# Patient Record
Sex: FEMALE | Race: BLACK OR AFRICAN AMERICAN | URBAN - METROPOLITAN AREA
[De-identification: names, ages, dates, MRNs, and addresses within clinical notes are randomized per-mention and may not be internally consistent; named-entity substitution may affect disease eponyms.]

---

## 2021-09-15 ENCOUNTER — WEB ENCOUNTER (OUTPATIENT)
Dept: URBAN - METROPOLITAN AREA CLINIC 126 | Facility: CLINIC | Age: 31
End: 2021-09-15

## 2021-09-15 ENCOUNTER — OFFICE VISIT (OUTPATIENT)
Dept: URBAN - METROPOLITAN AREA CLINIC 126 | Facility: CLINIC | Age: 31
End: 2021-09-15
Payer: COMMERCIAL

## 2021-09-15 DIAGNOSIS — K59.00 CONSTIPATION, UNSPECIFIED CONSTIPATION TYPE: ICD-10-CM

## 2021-09-15 DIAGNOSIS — R10.84 GENERALIZED ABDOMINAL PAIN: ICD-10-CM

## 2021-09-15 PROCEDURE — 99204 OFFICE O/P NEW MOD 45 MIN: CPT | Performed by: INTERNAL MEDICINE

## 2021-09-15 NOTE — HPI-TODAY'S VISIT:
The patient was referred by Dr. Rachel Nielsen for abdominal pain .   A copy of this document is being forwarded to the referring provider.31-year-old female with long history of constipation seen today for complaints of abdominal pain.  Constipation has been ongoing since she was a child.  June of 2020 she began having abdominal pain and bloating associated with constipation.  She uses various over-the-counter medications stool softeners and miralax with poor results.  She can go 2 weeks with no bowel movement and complains of severe gas.   Dicyclomine did not help with abdominal pain.  She was recently started on hyoscyamine. She will eventually eat a greasy fatty meal and drink a red bull and will have a bowel movement.  Her weight is stable.  Her appetite is good.  She denies anemia or overt GI bleeding.  There is no known family history of colon polyps, colon cancer, or IDB.

## 2021-09-21 ENCOUNTER — WEB ENCOUNTER (OUTPATIENT)
Dept: URBAN - METROPOLITAN AREA CLINIC 126 | Facility: CLINIC | Age: 31
End: 2021-09-21

## 2021-09-21 RX ORDER — LINACLOTIDE 290 UG/1
1 CAPSULE AT LEAST 30 MINUTES BEFORE THE FIRST MEAL OF THE DAY ON AN EMPTY STOMACH CAPSULE, GELATIN COATED ORAL ONCE A DAY
Qty: 30 CAPSULE | Refills: 5 | OUTPATIENT

## 2021-10-20 ENCOUNTER — OFFICE VISIT (OUTPATIENT)
Dept: URBAN - METROPOLITAN AREA CLINIC 126 | Facility: CLINIC | Age: 31
End: 2021-10-20
Payer: COMMERCIAL

## 2021-10-20 ENCOUNTER — LAB OUTSIDE AN ENCOUNTER (OUTPATIENT)
Dept: URBAN - METROPOLITAN AREA CLINIC 126 | Facility: CLINIC | Age: 31
End: 2021-10-20

## 2021-10-20 DIAGNOSIS — K59.00 CONSTIPATION, UNSPECIFIED CONSTIPATION TYPE: ICD-10-CM

## 2021-10-20 DIAGNOSIS — R10.84 GENERALIZED ABDOMINAL PAIN: ICD-10-CM

## 2021-10-20 PROCEDURE — 99214 OFFICE O/P EST MOD 30 MIN: CPT | Performed by: NURSE PRACTITIONER

## 2021-10-20 RX ORDER — LINACLOTIDE 290 UG/1
1 CAPSULE AT LEAST 30 MINUTES BEFORE THE FIRST MEAL OF THE DAY ON AN EMPTY STOMACH CAPSULE, GELATIN COATED ORAL ONCE A DAY
Qty: 30 CAPSULE | Refills: 5 | Status: ACTIVE | COMMUNITY

## 2021-10-20 NOTE — HPI-TODAY'S VISIT:
Very pleasant 31-year-old female seen today in follow-up for abdominal pain and constipation. Her constipation has been ongoing since childhood and she has had poor response to over-the-counter medications. At her initial visit we gave patient samples of Linzess 290 mcg. She had good results and requested a prescription which we sent. She has been taking hyoscyamine for abdominal pain. She is seen today in follow-up. The Linzess daily was too effective.  She decreased frequency to every 2 to 3 days.  She had continues to still have diarrhea.  The abdominal pain did not improve with hyoscyamine or with bowel movements.  She has poor appetite due to abdominal pain.  She does have a history of migraines which have been worse lately.

## 2021-10-22 PROBLEM — 197119006 ACUTE CONSTIPATION: Status: ACTIVE | Noted: 2021-10-22

## 2021-10-25 ENCOUNTER — OFFICE VISIT (OUTPATIENT)
Dept: URBAN - METROPOLITAN AREA SURGERY CENTER 19 | Facility: SURGERY CENTER | Age: 31
End: 2021-10-25

## 2021-11-22 ENCOUNTER — OFFICE VISIT (OUTPATIENT)
Dept: URBAN - METROPOLITAN AREA SURGERY CENTER 19 | Facility: SURGERY CENTER | Age: 31
End: 2021-11-22

## 2022-09-23 ENCOUNTER — OFFICE VISIT (OUTPATIENT)
Dept: URBAN - METROPOLITAN AREA CLINIC 92 | Facility: CLINIC | Age: 32
End: 2022-09-23
Payer: COMMERCIAL

## 2022-09-23 ENCOUNTER — LAB OUTSIDE AN ENCOUNTER (OUTPATIENT)
Dept: URBAN - METROPOLITAN AREA CLINIC 92 | Facility: CLINIC | Age: 32
End: 2022-09-23

## 2022-09-23 VITALS
HEART RATE: 76 BPM | SYSTOLIC BLOOD PRESSURE: 117 MMHG | BODY MASS INDEX: 26.74 KG/M2 | TEMPERATURE: 96.8 F | HEIGHT: 67 IN | WEIGHT: 170.4 LBS | DIASTOLIC BLOOD PRESSURE: 80 MMHG

## 2022-09-23 DIAGNOSIS — K21.9 GASTROESOPHAGEAL REFLUX DISEASE WITHOUT ESOPHAGITIS: ICD-10-CM

## 2022-09-23 DIAGNOSIS — K59.00 CONSTIPATION, UNSPECIFIED CONSTIPATION TYPE: ICD-10-CM

## 2022-09-23 DIAGNOSIS — R10.84 GENERALIZED ABDOMINAL PAIN: ICD-10-CM

## 2022-09-23 DIAGNOSIS — R11.0 NAUSEA: ICD-10-CM

## 2022-09-23 DIAGNOSIS — K58.1 IRRITABLE BOWEL SYNDROME WITH CONSTIPATION: ICD-10-CM

## 2022-09-23 PROCEDURE — 99214 OFFICE O/P EST MOD 30 MIN: CPT

## 2022-09-23 RX ORDER — FAMOTIDINE 40 MG/1
1 TABLET AT BEDTIME TABLET, FILM COATED ORAL ONCE A DAY
Qty: 90 TABLET | Refills: 1 | OUTPATIENT
Start: 2022-09-23

## 2022-09-23 RX ORDER — DICYCLOMINE HYDROCHLORIDE 10 MG/1
1 TABLET CAPSULE ORAL THREE TIMES A DAY
Qty: 270 TABLET | Refills: 1

## 2022-09-23 RX ORDER — TENAPANOR HYDROCHLORIDE 53.2 MG/1
1 TABLET IMMEDIATELY BEFORE MEALS TABLET ORAL TWICE A DAY
Qty: 180 TABLET | Refills: 1 | OUTPATIENT
Start: 2022-09-23 | End: 2023-03-22

## 2022-09-23 RX ORDER — LINACLOTIDE 290 UG/1
1 CAPSULE AT LEAST 30 MINUTES BEFORE THE FIRST MEAL OF THE DAY ON AN EMPTY STOMACH CAPSULE, GELATIN COATED ORAL ONCE A DAY
Qty: 30 CAPSULE | Refills: 5 | Status: ON HOLD | COMMUNITY

## 2022-09-23 RX ORDER — DICYCLOMINE HYDROCHLORIDE 10 MG/1
2 CAPSULES CAPSULE ORAL THREE TIMES A DAY
Status: ACTIVE | COMMUNITY

## 2022-09-23 NOTE — HPI-TODAY'S VISIT:
32-year-old female patient seen today due to increasing abdominal pain and constipation.   She was last seen by Lili Sharma NP on  with similar issues.    She states over the past year she has had increasing abdominal pain and worsening BM.  The abdominal pain is sharp and moves from her epigastric area to her lower abdomen.  She has this pain daily and is taking dicyclomine about twice a day for the pain which somewhat helps.  She states having a BM also helps the pain.  With this she also has nausea daily and was given Zofran for this which made her more nauseous, so she DC'd.  She has an average of 2 BM a week but states she can go 3 weeks in a row w/o having a BM.  When she does have a BM she states 90% of the time its diarrhea.  Her constipation has been ongoing since childhood, and she has had poor response to OTC medications. She has tried Linzess, Trulance, Motegrity with no relief.  Currently, she is not on any medication but states she does a lot of juice cleanses which helps.  She also states dicyclomine somewhat helps with this. A colonoscopy was recommended at last visit but her insurance refused to pay. She would like to have the colonoscopy now since they state they will pay. Denies family history of colon cancer or colon polyps, ulcerative colitis or Crohn's. She was recently seen at the ER after she came back from a trip from [place] and had vomiting and diarrhea for 3 weeks.  All of her urine, blood and stool studies came back normal.  CT was not done at this time.  We do not have records to review today. She also notes GERD symptoms intermittently and has not been on anything for this, reflux is worse at night.  Denies dysphagia, odynophagia, vomiting. Takes 1-2 ibuprofen daily for migraines.  She notes a family history of gastric cancer in her cousin who was diagnosed at age 26.

## 2022-09-30 ENCOUNTER — OFFICE VISIT (OUTPATIENT)
Dept: URBAN - METROPOLITAN AREA SURGERY CENTER 16 | Facility: SURGERY CENTER | Age: 32
End: 2022-09-30
Payer: COMMERCIAL

## 2022-09-30 DIAGNOSIS — R10.84 ABDOMINAL CRAMPING, GENERALIZED: ICD-10-CM

## 2022-09-30 DIAGNOSIS — K59.09 CHANGE IN BOWEL MOVEMENTS INTERMITTENT CONSTIPATION. URGENCY IN THE MORNING.: ICD-10-CM

## 2022-09-30 PROCEDURE — 45378 DIAGNOSTIC COLONOSCOPY: CPT | Performed by: INTERNAL MEDICINE

## 2022-09-30 PROCEDURE — G8907 PT DOC NO EVENTS ON DISCHARG: HCPCS | Performed by: INTERNAL MEDICINE

## 2022-10-12 PROBLEM — 440630006: Status: ACTIVE | Noted: 2021-09-22

## 2022-10-12 PROBLEM — 266435005: Status: ACTIVE | Noted: 2022-09-23

## 2022-10-12 PROBLEM — 14760008: Status: ACTIVE | Noted: 2021-09-15

## 2022-10-14 ENCOUNTER — OFFICE VISIT (OUTPATIENT)
Dept: URBAN - METROPOLITAN AREA CLINIC 92 | Facility: CLINIC | Age: 32
End: 2022-10-14
Payer: COMMERCIAL

## 2022-10-14 ENCOUNTER — DASHBOARD ENCOUNTERS (OUTPATIENT)
Age: 32
End: 2022-10-14

## 2022-10-14 VITALS
DIASTOLIC BLOOD PRESSURE: 80 MMHG | TEMPERATURE: 97.2 F | HEART RATE: 92 BPM | SYSTOLIC BLOOD PRESSURE: 105 MMHG | HEIGHT: 67 IN | BODY MASS INDEX: 26.37 KG/M2 | WEIGHT: 168 LBS

## 2022-10-14 DIAGNOSIS — K59.00 CONSTIPATION, UNSPECIFIED CONSTIPATION TYPE: ICD-10-CM

## 2022-10-14 DIAGNOSIS — K58.1 IRRITABLE BOWEL SYNDROME WITH CONSTIPATION: ICD-10-CM

## 2022-10-14 DIAGNOSIS — R10.84 GENERALIZED ABDOMINAL PAIN: ICD-10-CM

## 2022-10-14 DIAGNOSIS — K21.9 GASTROESOPHAGEAL REFLUX DISEASE WITHOUT ESOPHAGITIS: ICD-10-CM

## 2022-10-14 DIAGNOSIS — R11.0 NAUSEA: ICD-10-CM

## 2022-10-14 PROCEDURE — 99214 OFFICE O/P EST MOD 30 MIN: CPT

## 2022-10-14 RX ORDER — DICYCLOMINE HYDROCHLORIDE 10 MG/1
1 TABLET CAPSULE ORAL THREE TIMES A DAY
Qty: 270 TABLET | Refills: 1 | Status: ACTIVE | COMMUNITY

## 2022-10-14 RX ORDER — FAMOTIDINE 40 MG/1
1 TABLET AT BEDTIME TABLET, FILM COATED ORAL ONCE A DAY
Qty: 90 TABLET | Refills: 1 | Status: ACTIVE | COMMUNITY
Start: 2022-09-23

## 2022-10-14 RX ORDER — FAMOTIDINE 40 MG/1
1 TABLET AT BEDTIME TABLET, FILM COATED ORAL ONCE A DAY
Qty: 90 TABLET | Refills: 1 | OUTPATIENT

## 2022-10-14 RX ORDER — LINACLOTIDE 290 UG/1
1 CAPSULE AT LEAST 30 MINUTES BEFORE THE FIRST MEAL OF THE DAY ON AN EMPTY STOMACH CAPSULE, GELATIN COATED ORAL ONCE A DAY
Qty: 30 CAPSULE | Refills: 5 | Status: ON HOLD | COMMUNITY

## 2022-10-14 RX ORDER — DICYCLOMINE HYDROCHLORIDE 10 MG/1
1 TABLET CAPSULE ORAL THREE TIMES A DAY
Qty: 270 TABLET | Refills: 1

## 2022-10-14 RX ORDER — TENAPANOR HYDROCHLORIDE 53.2 MG/1
1 TABLET IMMEDIATELY BEFORE MEALS TABLET ORAL TWICE A DAY
Qty: 180 TABLET | Refills: 1 | Status: ACTIVE | COMMUNITY
Start: 2022-09-23 | End: 2023-03-22

## 2022-10-14 NOTE — HPI-TODAY'S VISIT:
32-year-old female patient seen today for f/u.   She states over the past year she has had increasing abdominal pain and worsening BM.  The abdominal pain is sharp and moves from her epigastric area to her lower abdomen.  She has this pain daily and is taking dicyclomine about twice a day for the pain which somewhat helps.  She states having a BM also helps the pain. I ordered a CT at last visit but this was not done due to insurance issues.  She has nausea daily and was given Zofran for this which made her more nauseous, so she DC'd. Currently states she has this every morning and sometimes nighttime but states its worse with migraines.  She has an average of 2 BM a week but states she can go 3 weeks in a row w/o having a BM.  When she does have a BM she states 90% of the time its diarrhea.  Her constipation has been ongoing since childhood, and she has had poor response to OTC medications. She has tried Linzess 290 which caused diarrhea and Trulance with no relief. She also states dicyclomine somewhat helps with this. Colonoscopy 9/30/22: [facility] o/w normal. She states she had increased diarrhea from ibsrela so she d/c this medication this week.  She also notes GERD symptoms intermittently reflux is worse at night.  She tried famotidine and d/c this with her ibsrela bc she didn't know which one causes diarrhea. Doesn't know if this helped. Denies dysphagia, odynophagia, vomiting. Takes 1-2 ibuprofen every few days for migraines.   She notes a family history of gastric cancer in her cousin who was diagnosed at age 26.

## 2022-10-21 ENCOUNTER — WEB ENCOUNTER (OUTPATIENT)
Dept: URBAN - METROPOLITAN AREA CLINIC 92 | Facility: CLINIC | Age: 32
End: 2022-10-21

## 2022-10-21 PROBLEM — 82934008: Status: ACTIVE | Noted: 2022-10-21

## 2022-10-21 RX ORDER — LINACLOTIDE 72 UG/1
1 CAPSULE AT LEAST 30 MINUTES BEFORE THE FIRST MEAL OF THE DAY ON AN EMPTY STOMACH CAPSULE, GELATIN COATED ORAL ONCE A DAY
Qty: 90 | Refills: 0 | OUTPATIENT
Start: 2022-10-21 | End: 2023-01-18

## 2022-10-21 RX ORDER — TENAPANOR HYDROCHLORIDE 53.2 MG/1
1 TABLET IMMEDIATELY BEFORE MEALS TABLET ORAL TWICE A DAY
Qty: 180 TABLET | Refills: 1 | Status: ACTIVE | COMMUNITY
Start: 2022-09-23 | End: 2023-03-22

## 2022-10-21 RX ORDER — LINACLOTIDE 290 UG/1
1 CAPSULE AT LEAST 30 MINUTES BEFORE THE FIRST MEAL OF THE DAY ON AN EMPTY STOMACH CAPSULE, GELATIN COATED ORAL ONCE A DAY
Qty: 30 CAPSULE | Refills: 5 | Status: ON HOLD | COMMUNITY

## 2022-10-21 RX ORDER — FAMOTIDINE 40 MG/1
1 TABLET AT BEDTIME TABLET, FILM COATED ORAL ONCE A DAY
Qty: 90 TABLET | Refills: 1 | Status: ACTIVE | COMMUNITY

## 2022-10-21 RX ORDER — DICYCLOMINE HYDROCHLORIDE 10 MG/1
1 TABLET CAPSULE ORAL THREE TIMES A DAY
Qty: 270 TABLET | Refills: 1 | Status: ACTIVE | COMMUNITY

## 2022-10-24 ENCOUNTER — WEB ENCOUNTER (OUTPATIENT)
Dept: URBAN - METROPOLITAN AREA CLINIC 92 | Facility: CLINIC | Age: 32
End: 2022-10-24

## 2023-01-22 ENCOUNTER — ERX REFILL RESPONSE (OUTPATIENT)
Dept: URBAN - METROPOLITAN AREA CLINIC 92 | Facility: CLINIC | Age: 33
End: 2023-01-22

## 2023-01-22 RX ORDER — LINACLOTIDE 72 UG/1
TAKE 1 CAPSULE ORALLY AT LEAST 30MIN. BEFORE THE FIRST MEAL OF THE DAY ON AN EMPTY STOMACH X90 DAYS CAPSULE, GELATIN COATED ORAL
Qty: 90 CAPSULE | Refills: 0 | OUTPATIENT

## 2023-05-08 ENCOUNTER — ERX REFILL RESPONSE (OUTPATIENT)
Dept: URBAN - METROPOLITAN AREA CLINIC 92 | Facility: CLINIC | Age: 33
End: 2023-05-08

## 2023-05-08 RX ORDER — LINACLOTIDE 72 UG/1
TAKE 1 CAPSULE ORALLY AT LEAST 30MIN. BEFORE THE FIRST MEAL OF THE DAY ON AN EMPTY STOMACH X90 DAYS CAPSULE, GELATIN COATED ORAL
Qty: 90 CAPSULE | Refills: 0 | OUTPATIENT

## 2023-05-08 RX ORDER — LINACLOTIDE 72 UG/1
TAKE 1 CAPSULE ORALLY AT LEAST 30MIN BEFORE THE FIRST MEAL OF THE DAY ON AN EMPTY STOMACH FOR90 DAYS CAPSULE, GELATIN COATED ORAL
Qty: 90 CAPSULE | Refills: 0 | OUTPATIENT